# Patient Record
Sex: MALE | Race: BLACK OR AFRICAN AMERICAN | NOT HISPANIC OR LATINO | Employment: UNEMPLOYED | URBAN - METROPOLITAN AREA
[De-identification: names, ages, dates, MRNs, and addresses within clinical notes are randomized per-mention and may not be internally consistent; named-entity substitution may affect disease eponyms.]

---

## 2018-04-17 ENCOUNTER — OFFICE VISIT (OUTPATIENT)
Dept: FAMILY MEDICINE | Facility: CLINIC | Age: 38
End: 2018-04-17
Payer: COMMERCIAL

## 2018-04-17 VITALS
RESPIRATION RATE: 16 BRPM | DIASTOLIC BLOOD PRESSURE: 80 MMHG | WEIGHT: 180 LBS | SYSTOLIC BLOOD PRESSURE: 130 MMHG | BODY MASS INDEX: 27.28 KG/M2 | HEART RATE: 104 BPM | OXYGEN SATURATION: 100 % | HEIGHT: 68 IN

## 2018-04-17 DIAGNOSIS — M54.41 CHRONIC MIDLINE LOW BACK PAIN WITH BILATERAL SCIATICA: ICD-10-CM

## 2018-04-17 DIAGNOSIS — F33.1 MODERATE EPISODE OF RECURRENT MAJOR DEPRESSIVE DISORDER (H): Primary | ICD-10-CM

## 2018-04-17 DIAGNOSIS — J20.9 ACUTE BRONCHITIS TREATED WITH ANTIBIOTICS IN THE PAST 60 DAYS: ICD-10-CM

## 2018-04-17 DIAGNOSIS — Z23 NEED FOR PROPHYLACTIC VACCINATION AGAINST STREPTOCOCCUS PNEUMONIAE (PNEUMOCOCCUS): ICD-10-CM

## 2018-04-17 DIAGNOSIS — E11.65 TYPE 2 DIABETES MELLITUS WITH HYPERGLYCEMIA, WITH LONG-TERM CURRENT USE OF INSULIN (H): ICD-10-CM

## 2018-04-17 DIAGNOSIS — M54.42 CHRONIC MIDLINE LOW BACK PAIN WITH BILATERAL SCIATICA: ICD-10-CM

## 2018-04-17 DIAGNOSIS — Z55.0 ILLITERACY: ICD-10-CM

## 2018-04-17 DIAGNOSIS — G89.29 CHRONIC MIDLINE LOW BACK PAIN WITH BILATERAL SCIATICA: ICD-10-CM

## 2018-04-17 DIAGNOSIS — E78.5 HYPERLIPIDEMIA LDL GOAL <100: ICD-10-CM

## 2018-04-17 DIAGNOSIS — F33.41 RECURRENT MAJOR DEPRESSIVE DISORDER, IN PARTIAL REMISSION (H): ICD-10-CM

## 2018-04-17 DIAGNOSIS — Z79.4 TYPE 2 DIABETES MELLITUS WITH HYPERGLYCEMIA, WITH LONG-TERM CURRENT USE OF INSULIN (H): ICD-10-CM

## 2018-04-17 DIAGNOSIS — Z23 NEED FOR PROPHYLACTIC VACCINATION WITH TETANUS-DIPHTHERIA (TD): ICD-10-CM

## 2018-04-17 DIAGNOSIS — E55.9 VITAMIN D DEFICIENCY: ICD-10-CM

## 2018-04-17 DIAGNOSIS — Z13.89 SCREENING FOR DIABETIC PERIPHERAL NEUROPATHY: ICD-10-CM

## 2018-04-17 LAB — HBA1C MFR BLD: 10.9 % (ref 0–5.6)

## 2018-04-17 PROCEDURE — 99205 OFFICE O/P NEW HI 60 MIN: CPT | Performed by: FAMILY MEDICINE

## 2018-04-17 PROCEDURE — 36415 COLL VENOUS BLD VENIPUNCTURE: CPT | Performed by: FAMILY MEDICINE

## 2018-04-17 PROCEDURE — 80048 BASIC METABOLIC PNL TOTAL CA: CPT | Performed by: FAMILY MEDICINE

## 2018-04-17 PROCEDURE — 84460 ALANINE AMINO (ALT) (SGPT): CPT | Performed by: FAMILY MEDICINE

## 2018-04-17 PROCEDURE — 84443 ASSAY THYROID STIM HORMONE: CPT | Performed by: FAMILY MEDICINE

## 2018-04-17 PROCEDURE — 82043 UR ALBUMIN QUANTITATIVE: CPT | Performed by: FAMILY MEDICINE

## 2018-04-17 PROCEDURE — 80061 LIPID PANEL: CPT | Performed by: FAMILY MEDICINE

## 2018-04-17 PROCEDURE — 83036 HEMOGLOBIN GLYCOSYLATED A1C: CPT | Performed by: FAMILY MEDICINE

## 2018-04-17 RX ORDER — ATORVASTATIN CALCIUM 80 MG/1
80 TABLET, FILM COATED ORAL DAILY
Qty: 30 TABLET | Refills: 11 | Status: SHIPPED | OUTPATIENT
Start: 2018-04-17

## 2018-04-17 RX ORDER — FLUOXETINE 10 MG/1
10 CAPSULE ORAL DAILY
Qty: 42 CAPSULE | Refills: 0 | Status: SHIPPED | OUTPATIENT
Start: 2018-04-17

## 2018-04-17 RX ORDER — LISINOPRIL 2.5 MG/1
2.5 TABLET ORAL DAILY
Qty: 90 TABLET | Refills: 3 | Status: SHIPPED | OUTPATIENT
Start: 2018-04-17

## 2018-04-17 RX ORDER — ASPIRIN 81 MG/1
81 TABLET ORAL
COMMUNITY
Start: 2018-03-21 | End: 2018-04-17

## 2018-04-17 RX ORDER — ATORVASTATIN CALCIUM 80 MG/1
80 TABLET, FILM COATED ORAL
COMMUNITY
Start: 2018-03-21

## 2018-04-17 RX ORDER — LISINOPRIL 2.5 MG/1
2.5 TABLET ORAL
COMMUNITY
Start: 2018-03-21 | End: 2018-04-17

## 2018-04-17 RX ORDER — ASPIRIN 81 MG/1
81 TABLET ORAL DAILY
Qty: 90 TABLET | Refills: 3 | Status: SHIPPED | OUTPATIENT
Start: 2018-04-17

## 2018-04-17 RX ORDER — INSULIN GLARGINE 100 [IU]/ML
48 INJECTION, SOLUTION SUBCUTANEOUS DAILY
Qty: 15 ML | Refills: 1 | Status: SHIPPED | OUTPATIENT
Start: 2018-04-17 | End: 2018-04-20

## 2018-04-17 SDOH — EDUCATIONAL SECURITY - EDUCATION ATTAINMENT: ILITERACY AND LOW LEVEL LITERACY: Z55.0

## 2018-04-17 ASSESSMENT — PATIENT HEALTH QUESTIONNAIRE - PHQ9
SUM OF ALL RESPONSES TO PHQ QUESTIONS 1-9: 20
SUM OF ALL RESPONSES TO PHQ QUESTIONS 1-9: 20
10. IF YOU CHECKED OFF ANY PROBLEMS, HOW DIFFICULT HAVE THESE PROBLEMS MADE IT FOR YOU TO DO YOUR WORK, TAKE CARE OF THINGS AT HOME, OR GET ALONG WITH OTHER PEOPLE: EXTREMELY DIFFICULT

## 2018-04-17 NOTE — PATIENT INSTRUCTIONS
(F33.1) Moderate episode of recurrent major depressive disorder (H)  (primary encounter diagnosis)  Comment:    Plan: PSYCHOLOGY REFERRAL, FLUoxetine (PROZAC) 10 MG         capsule, FLUoxetine (PROZAC) 20 MG capsule             (Z55.0) Illiteracy  Comment:    Plan: PSYCHOLOGY REFERRAL                 (Z13.89) Screening for diabetic peripheral neuropathy  Comment:     Plan: FOOT EXAM  NO CHARGE [86181.114]             (Z23) Need for prophylactic vaccination against Streptococcus pneumoniae (pneumococcus)  Comment:    Plan:      (Z23) Need for prophylactic vaccination with tetanus-diphtheria (TD)  Comment:    Plan:      (E55.9) Vitamin D deficiency  Comment:    Plan:      (M54.41,  M54.42,  G89.29) Chronic midline low back pain with bilateral sciatica  Comment:    Plan:      (E78.5) Hyperlipidemia LDL goal <100  Comment:    Plan: Lipid panel reflex to direct LDL Fasting, ALT,         atorvastatin (LIPITOR) 80 MG tablet, aspirin 81        MG EC tablet, aspirin 81 MG EC tablet,         CANCELED: Lipid panel reflex to direct LDL         Fasting             (F33.41) Recurrent major depressive disorder, in partial remission (H)  Comment:    Plan:      (E11.65,  Z79.4) Type 2 diabetes mellitus with hyperglycemia, with long-term current use of insulin (H)  Comment:    Plan: HEMOGLOBIN A1C, Albumin Random Urine         Quantitative with Creat Ratio, TSH WITH FREE T4        REFLEX, Basic metabolic panel, BASAGLAR 100         UNIT/ML injection, metFORMIN (GLUCOPHAGE) 1000         MG tablet, aspirin 81 MG EC tablet, aspirin 81         MG EC tablet, lisinopril (PRINIVIL/ZESTRIL) 2.5        MG tablet, aspirin EC 81 MG EC tablet, insulin         pen needle (FIFTY50 PEN NEEDLES) 32G X 6 MM,         CANCELED: Hemoglobin A1c             (J20.9) Acute bronchitis treated with antibiotics in the past 60 days  Comment:    Plan:

## 2018-04-17 NOTE — NURSING NOTE
"Chief Complaint   Patient presents with     Patient Request for Note/Letter     pt not seen here since 2013 pt has been going to [facility]       Initial /80  Pulse 104  Resp 16  Ht 5' 8\" (1.727 m)  Wt 180 lb (81.6 kg)  SpO2 100%  BMI 27.37 kg/m2 Estimated body mass index is 27.37 kg/(m^2) as calculated from the following:    Height as of this encounter: 5' 8\" (1.727 m).    Weight as of this encounter: 180 lb (81.6 kg).  Medication Reconciliation: anthony Lala CMA      "

## 2018-04-17 NOTE — LETTER
"April 20, 2018      Aguilar CHINTAN Phillips  [address]  [facility] [address]        Dear Jacqueline,    We are writing to inform you of your test results.    Abnormal DIABETES URINE PROTEIN TEST   HIGH TOTAL CHOLESTEROL   NORMAL TRIGLYCERIDES   NORMAL HDL OR \"GOOD\" CHOLESTEROL   LDL OR \"BAD\" CHOLESTEROL 50% TOO HIGH   BORDERLINE  HIGH VERY LOW DENSITY CHOLESTEROL   NORMAL THYROID STIMULATING HORMONE TEST   NORMAL LIVER FUNCTION TEST   HIGH FASTING BLOOD SUGAR 100 POINTS TOO HIGH   NORMAL RENAL FUNCTION   NORMAL BLOOD SALTS   POORLY CONTROLLED DIABETES   A1C AVERAGE    GOAL A1C AVERAGE  OR LESS   ,THAT IS 8 OR LESS   Current Outpatient Prescriptions:   aspirin 81 MG EC tablet   aspirin 81 MG EC tablet   aspirin EC 81 MG EC tablet   atorvastatin (LIPITOR) 80 MG tablet   atorvastatin (LIPITOR) 80 MG tablet   BASAGLAR 100 UNIT/ML injection   FLUoxetine (PROZAC) 10 MG capsule   [START ON 5/17/2018] FLUoxetine (PROZAC) 20 MG capsule   insulin pen needle (FIFTY50 PEN NEEDLES) 32G X 6 MM   lisinopril (PRINIVIL/ZESTRIL) 2.5 MG tablet   metFORMIN (GLUCOPHAGE) 1000 MG tablet   Alcohol Swabs 70 % PADS   Blood Glucose Monitoring Suppl ([employer] a [employer] CONTOUR MONITOR) W/DEVICE KIT   Glucose Blood (LIT CONTOUR TEST) strip   Insulin Pen Needle (B-D U/F PEN NEEDLE) needle   STRONGLY RECOMMENDED THAT YOU INCREASE  BASAGLAR SLOWLY  48 INCREASE DAILY UNTIL   FASTING BLOOD SUGAR  MG% OR LESS     Resulted Orders   HEMOGLOBIN A1C   Result Value Ref Range    Hemoglobin A1C 10.9 (H) 0 - 5.6 %      Comment:      Normal <5.7% Prediabetes 5.7-6.4%  Diabetes 6.5% or higher - adopted from ADA   consensus guidelines.     Lipid panel reflex to direct LDL Fasting   Result Value Ref Range    Cholesterol 225 (H) <200 mg/dL      Comment:      Desirable:       <200 mg/dl    Triglycerides 146 <150 mg/dL      Comment:      Non Fasting    HDL Cholesterol 50 >39 mg/dL    LDL Cholesterol Calculated 146 (H) <100 mg/dL      Comment:      Above " desirable:  100-129 mg/dl  Borderline High:  130-159 mg/dL  High:             160-189 mg/dL  Very high:       >189 mg/dl      Non HDL Cholesterol 175 (H) <130 mg/dL      Comment:      Above Desirable:  130-159 mg/dl  Borderline high:  160-189 mg/dl  High:             190-219 mg/dl  Very high:       >219 mg/dl     Albumin Random Urine Quantitative with Creat Ratio   Result Value Ref Range    Creatinine Urine 217 mg/dL    Albumin Urine mg/L 78 mg/L    Albumin Urine mg/g Cr 35.76 (H) 0 - 17 mg/g Cr   TSH WITH FREE T4 REFLEX   Result Value Ref Range    TSH 2.93 0.40 - 4.00 mU/L   Basic metabolic panel   Result Value Ref Range    Sodium 137 133 - 144 mmol/L    Potassium 3.7 3.4 - 5.3 mmol/L    Chloride 102 94 - 109 mmol/L    Carbon Dioxide 28 20 - 32 mmol/L    Anion Gap 7 3 - 14 mmol/L    Glucose 201 (H) 70 - 99 mg/dL      Comment:      Non Fasting    Urea Nitrogen 12 7 - 30 mg/dL    Creatinine 0.56 (L) 0.66 - 1.25 mg/dL    GFR Estimate >90 >60 mL/min/1.7m2      Comment:      Non  GFR Calc    GFR Estimate If Black >90 >60 mL/min/1.7m2      Comment:       GFR Calc    Calcium 9.1 8.5 - 10.1 mg/dL   ALT   Result Value Ref Range    ALT 27 0 - 70 U/L       If you have any questions or concerns, please call the clinic at the number listed above.       Sincerely,        THO ROCK MD
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
## 2018-04-17 NOTE — MR AVS SNAPSHOT
After Visit Summary   4/17/2018    Aguilar Phillips    MRN: [id number]           Patient Information     Date Of Birth          1980        Visit Information        Provider Department      4/17/2018 1:00 PM John Swanson MD; [employer] [facility]        Today's Diagnoses     Moderate episode of recurrent major depressive disorder (H)    -  1    Illiteracy        Screening for diabetic peripheral neuropathy        Need for prophylactic vaccination against Streptococcus pneumoniae (pneumococcus)        Need for prophylactic vaccination with tetanus-diphtheria (TD)        Vitamin D deficiency        Chronic midline low back pain with bilateral sciatica        Hyperlipidemia LDL goal <100        Recurrent major depressive disorder, in partial remission (H)        Type 2 diabetes mellitus with hyperglycemia, with long-term current use of insulin (H)        Acute bronchitis treated with antibiotics in the past 60 days          Care Instructions    (F33.1) Moderate episode of recurrent major depressive disorder (H)  (primary encounter diagnosis)  Comment:    Plan: PSYCHOLOGY REFERRAL, FLUoxetine (PROZAC) 10 MG         capsule, FLUoxetine (PROZAC) 20 MG capsule             (Z55.0) Illiteracy  Comment:    Plan: PSYCHOLOGY REFERRAL                 (Z13.89) Screening for diabetic peripheral neuropathy  Comment:     Plan: FOOT EXAM  NO CHARGE [69476.114]             (Z23) Need for prophylactic vaccination against Streptococcus pneumoniae (pneumococcus)  Comment:    Plan:      (Z23) Need for prophylactic vaccination with tetanus-diphtheria (TD)  Comment:    Plan:      (E55.9) Vitamin D deficiency  Comment:    Plan:      (M54.41,  M54.42,  G89.29) Chronic midline low back pain with bilateral sciatica  Comment:    Plan:      (E78.5) Hyperlipidemia LDL goal <100  Comment:    Plan: Lipid panel reflex to direct LDL Fasting, ALT,         atorvastatin  (LIPITOR) 80 MG tablet, aspirin 81        MG EC tablet, aspirin 81 MG EC tablet,         CANCELED: Lipid panel reflex to direct LDL         Fasting             (F33.41) Recurrent major depressive disorder, in partial remission (H)  Comment:    Plan:      (E11.65,  Z79.4) Type 2 diabetes mellitus with hyperglycemia, with long-term current use of insulin (H)  Comment:    Plan: HEMOGLOBIN A1C, Albumin Random Urine         Quantitative with Creat Ratio, TSH WITH FREE T4        REFLEX, Basic metabolic panel, BASAGLAR 100         UNIT/ML injection, metFORMIN (GLUCOPHAGE) 1000         MG tablet, aspirin 81 MG EC tablet, aspirin 81         MG EC tablet, lisinopril (PRINIVIL/ZESTRIL) 2.5        MG tablet, aspirin EC 81 MG EC tablet, insulin         pen needle (FIFTY50 PEN NEEDLES) 32G X 6 MM,         CANCELED: Hemoglobin A1c             (J20.9) Acute bronchitis treated with antibiotics in the past 60 days  Comment:    Plan:                Follow-ups after your visit        Additional Services     PSYCHOLOGY REFERRAL       Your provider has referred you to:  PABLO SAENZ PHD  LICENSED PSYCHOLOGIST  [address] [address]  PHONE [phone]  FAX: [phone]  E-MAIL: [email or web address]    John Swanson Jr., MD   [address]  [place] [address]    PHONE [phone]  FAX:[phone]    PATIENT NAME Aguilar Phillips    SEX: male   YOB: 1980  SOCIAL SECURITY nUMBER: 31-  Green card number [id number]  PHONE NUMBER: [phone]  ADDRESS: *[address]  [address]  [facility] [address]  Phone: [phone]  Fax: [phone]  Brian[address]  Jose velázquez [address]  iNSURANCE NAME AND NUMBER IF KNOWN:  [employer] insurance  LANGUAGE: [place]an  NAME AND NUMBER OF REGULAR : [phone]  PURPOSE OF REFERRAL:  SSI___  INS WAIVER__x_ PERSONAL CARE  "ASSISTANT SERVICES:___ MENTAL HEALTH SERVICES:____  OTHER: ___  PSYCHOTROPIC MEDICATION: IF ANY:      Please be aware that coverage of these services is subject to the terms and limitations of your health insurance plan.  Call member services at your health plan with any benefit or coverage questions.      Please bring the following to your appointment:    >>   Any x-rays, CTs or MRIs which have been performed.  Contact the facility where they were done to arrange for  prior to your scheduled appointment.   >>   List of current medications   >>   This referral request   >>   Any documents/labs given to you for this referral                  Who to contact     If you have questions or need follow up information about today's clinic visit or your schedule please contact [facility] directly at [phone].  Normal or non-critical lab and imaging results will be communicated to you by MyChart, letter or phone within 4 business days after the clinic has received the results. If you do not hear from us within 7 days, please contact the clinic through MyChart or phone. If you have a critical or abnormal lab result, we will notify you by phone as soon as possible.  Submit refill requests through [employer] or call your pharmacy and they will forward the refill request to us. Please allow 3 business days for your refill to be completed.          Additional Information About Your Visit        [employer] Information     [employer] lets you send messages to your doctor, view your test results, renew your prescriptions, schedule appointments and more. To sign up, go to [email or web address] . Click on \"Log in\" on the left side of the screen, which will take you to the Welcome page. Then click on \"Sign up Now\" on the right side of the page.     You will be asked to enter the access code listed below, as well as some personal information. Please follow the directions to create your username and " "password.     Your access code is: [email or web address]  Expires: 2018  2:32 PM     Your access code will  in 90 days. If you need help or a new code, please call your [facility] clinic or [phone].        Care EveryWhere ID     This is your Care EveryWhere ID. This could be used by other organizations to access your [facility] medical records  [id number]        Your Vitals Were     Pulse Respirations Height Pulse Oximetry BMI (Body Mass Index)       104 16 5' 8\" (1.727 m) 100% 27.37 kg/m2        Blood Pressure from Last 3 Encounters:   18 130/80   10/05/13 141/89   13 118/79    Weight from Last 3 Encounters:   18 180 lb (81.6 kg)   13 174 lb 9.6 oz (79.2 kg)   13 182 lb (82.6 kg)              We Performed the Following     Albumin Random Urine Quantitative with Creat Ratio     ALT     Basic metabolic panel     DEPRESSION ACTION PLAN (DAP)     FOOT EXAM  NO CHARGE [25089.114]     HEMOGLOBIN A1C     Lipid panel reflex to direct LDL Fasting     PSYCHOLOGY REFERRAL     TSH WITH FREE T4 REFLEX          Today's Medication Changes          These changes are accurate as of 18  2:33 PM.  If you have any questions, ask your nurse or doctor.               Start taking these medicines.        Dose/Directions    * FLUoxetine 10 MG capsule   Commonly known as:  PROzac   Used for:  Moderate episode of recurrent major depressive disorder (H)   Started by:  John Swanson MD        Dose:  10 mg   Take 1 capsule (10 mg) by mouth daily   Quantity:  42 capsule   Refills:  0       * FLUoxetine 20 MG capsule   Commonly known as:  PROzac   Used for:  Moderate episode of recurrent major depressive disorder (H)   Started by:  John Swanson MD        Dose:  20 mg   Start taking on:  2018   Take 1 capsule (20 mg) by mouth daily   Quantity:  90 capsule   Refills:  3       * Notice:  This list has 2 medication(s) that are the same as other medications prescribed for you. Read the " directions carefully, and ask your doctor or other care provider to review them with you.      These medicines have changed or have updated prescriptions.        Dose/Directions    * aspirin 81 MG EC tablet   This may have changed:  You were already taking a medication with the same name, and this prescription was added. Make sure you understand how and when to take each.   Used for:  Type 2 diabetes mellitus with hyperglycemia, with long-term current use of insulin (H), Hyperlipidemia LDL goal <100   Changed by:  John Swanson MD        Dose:  81 mg   Take 1 tablet (81 mg) by mouth daily   Quantity:  90 tablet   Refills:  prn       * aspirin 81 MG EC tablet   This may have changed:  You were already taking a medication with the same name, and this prescription was added. Make sure you understand how and when to take each.   Used for:  Type 2 diabetes mellitus with hyperglycemia, with long-term current use of insulin (H), Hyperlipidemia LDL goal <100   Changed by:  John Swanson MD        Dose:  81 mg   Take 1 tablet (81 mg) by mouth daily   Quantity:  90 tablet   Refills:  prn       * aspirin EC 81 MG EC tablet   This may have changed:  when to take this   Used for:  Type 2 diabetes mellitus with hyperglycemia, with long-term current use of insulin (H)   Changed by:  John Swanson MD        Dose:  81 mg   Take 1 tablet (81 mg) by mouth daily   Quantity:  90 tablet   Refills:  3       * atorvastatin 80 MG tablet   Commonly known as:  LIPITOR   This may have changed:  Another medication with the same name was added. Make sure you understand how and when to take each.   Changed by:  John Swanson MD        Dose:  80 mg   Take 80 mg by mouth   Refills:  0       * atorvastatin 80 MG tablet   Commonly known as:  LIPITOR   This may have changed:  You were already taking a medication with the same name, and this prescription was added. Make sure you understand how and when to  take each.   Used for:  Hyperlipidemia LDL goal <100   Changed by:  John Swanson MD        Dose:  80 mg   Take 1 tablet (80 mg) by mouth daily   Quantity:  30 tablet   Refills:  11       * B-D U/F 31G X 5 MM   This may have changed:  Another medication with the same name was changed. Make sure you understand how and when to take each.   Used for:  Type II or unspecified type diabetes mellitus without mention of complication, uncontrolled, Type 2 diabetes, HbA1c goal < 7% (H)   Generic drug:  insulin pen needle   Changed by:  John Swanson MD        Usex  One time  daily or as directed. At bed time   Quantity:  100 each   Refills:  prn       * insulin pen needle 32G X 6 MM   Commonly known as:  FIFTY50 PEN NEEDLES   This may have changed:  See the new instructions.   Used for:  Type 2 diabetes mellitus with hyperglycemia, with long-term current use of insulin (H)   Changed by:  John Swanson MD        Pen needles   Quantity:  100 each   Refills:  11       * LANTUS SOLOSTAR 100 UNIT/ML injection   This may have changed:  Another medication with the same name was added. Make sure you understand how and when to take each.   Used for:  Type II or unspecified type diabetes mellitus without mention of complication, uncontrolled, Type 2 diabetes, HbA1c goal < 7% (H)   Generic drug:  insulin glargine   Changed by:  John Swanson MD        Dose:  14 Units   Inject 14 Units Subcutaneous At Bedtime.   Quantity:  1 Month   Refills:  11       * BASAGLAR 100 UNIT/ML injection   This may have changed:  You were already taking a medication with the same name, and this prescription was added. Make sure you understand how and when to take each.   Used for:  Type 2 diabetes mellitus with hyperglycemia, with long-term current use of insulin (H)   Changed by:  John Swanson MD        Dose:  48 Units   Inject 48 Units Subcutaneous daily   Quantity:  15 mL   Refills:  1        lisinopril 2.5 MG tablet   Commonly known as:  PRINIVIL/Zestril   This may have changed:  when to take this   Used for:  Type 2 diabetes mellitus with hyperglycemia, with long-term current use of insulin (H)   Changed by:  John Swanson MD        Dose:  2.5 mg   Take 1 tablet (2.5 mg) by mouth daily   Quantity:  90 tablet   Refills:  3       metFORMIN 1000 MG tablet   Commonly known as:  GLUCOPHAGE   This may have changed:  when to take this   Used for:  Type 2 diabetes mellitus with hyperglycemia, with long-term current use of insulin (H)   Changed by:  John Swanson MD        Dose:  1000 mg   Take 1 tablet (1,000 mg) by mouth 2 times daily (with meals)   Quantity:  60 tablet   Refills:  11       * Notice:  This list has 9 medication(s) that are the same as other medications prescribed for you. Read the directions carefully, and ask your doctor or other care provider to review them with you.         Where to get your medicines      These medications were sent to [facility] [address] [address]     Phone:  [phone]     aspirin 81 MG EC tablet    aspirin 81 MG EC tablet    aspirin EC 81 MG EC tablet    atorvastatin 80 MG tablet    BASAGLAR 100 UNIT/ML injection    FLUoxetine 10 MG capsule    FLUoxetine 20 MG capsule    insulin pen needle 32G X 6 MM    lisinopril 2.5 MG tablet    metFORMIN 1000 MG tablet                Primary Care Provider Office Phone # Fax #    John Swanson [phone] [address] [facility] [address]        Equal Access to Services     [facility] FRANCHESCA AH: Hadii leoncio songo [place], waaxda luqadaha, qaybta kaalmada adeegwicho odrado idiin hayaan adeeg kharash la'aan . So [facility] [phone].    ATENCIÓN: Si habla español, tiene a contreras disposición servicios gratuitos de asistencia lingüística. Llame al [phone].    We comply with applicable federal civil rights laws and [place]  laws. We do not discriminate on the basis of race, color, national origin, age, disability, sex, sexual orientation, or gender identity.            Thank you!     Thank you for choosing [facility]  for your care. Our goal is always to provide you with excellent care. Hearing back from our patients is one way we can continue to improve our services. Please take a few minutes to complete the written survey that you may receive in the mail after your visit with us. Thank you!             Your Updated Medication List - Protect others around you: Learn how to safely use, store and throw away your medicines at www.disposemymeds.org.          This list is accurate as of 4/17/18  2:33 PM.  Always use your most recent med list.                   Brand Name Dispense Instructions for use Diagnosis    alcohol swab prep pads     100 each    1 pad At Bedtime.    Type II or unspecified type diabetes mellitus without mention of complication, uncontrolled, Type 2 diabetes, HbA1c goal < 7% (H)       * aspirin 81 MG EC tablet     90 tablet    Take 1 tablet (81 mg) by mouth daily    Type 2 diabetes mellitus with hyperglycemia, with long-term current use of insulin (H), Hyperlipidemia LDL goal <100       * aspirin 81 MG EC tablet     90 tablet    Take 1 tablet (81 mg) by mouth daily    Type 2 diabetes mellitus with hyperglycemia, with long-term current use of insulin (H), Hyperlipidemia LDL goal <100       * aspirin EC 81 MG EC tablet     90 tablet    Take 1 tablet (81 mg) by mouth daily    Type 2 diabetes mellitus with hyperglycemia, with long-term current use of insulin (H)       * atorvastatin 80 MG tablet    LIPITOR     Take 80 mg by mouth        * atorvastatin 80 MG tablet    LIPITOR    30 tablet    Take 1 tablet (80 mg) by mouth daily    Hyperlipidemia LDL goal <100       * B-D U/F 31G X 5 MM   Generic drug:  insulin pen needle     100 each    Usex  One time  daily or as directed. At bed time     Type II or unspecified type diabetes mellitus without mention of complication, uncontrolled, Type 2 diabetes, HbA1c goal < 7% (H)       * insulin pen needle 32G X 6 MM    FIFTY50 PEN NEEDLES    100 each    Pen needles    Type 2 diabetes mellitus with hyperglycemia, with long-term current use of insulin (H)       LIT CONTOUR test strip   Generic drug:  blood glucose monitoring     100 strip    Use twice daily    Diabetes mellitus, type 2 (H)       blood glucose monitoring meter device kit     1 kit    Use to test blood sugars 2 times daily as directed.    Diabetes mellitus, type 2 (H)       * FLUoxetine 10 MG capsule    PROzac    42 capsule    Take 1 capsule (10 mg) by mouth daily    Moderate episode of recurrent major depressive disorder (H)       * FLUoxetine 20 MG capsule   Start taking on:  5/17/2018    PROzac    90 capsule    Take 1 capsule (20 mg) by mouth daily    Moderate episode of recurrent major depressive disorder (H)       * LANTUS SOLOSTAR 100 UNIT/ML injection   Generic drug:  insulin glargine     1 Month    Inject 14 Units Subcutaneous At Bedtime.    Type II or unspecified type diabetes mellitus without mention of complication, uncontrolled, Type 2 diabetes, HbA1c goal < 7% (H)       * BASAGLAR 100 UNIT/ML injection     15 mL    Inject 48 Units Subcutaneous daily    Type 2 diabetes mellitus with hyperglycemia, with long-term current use of insulin (H)       lisinopril 2.5 MG tablet    PRINIVIL/Zestril    90 tablet    Take 1 tablet (2.5 mg) by mouth daily    Type 2 diabetes mellitus with hyperglycemia, with long-term current use of insulin (H)       metFORMIN 1000 MG tablet    GLUCOPHAGE    60 tablet    Take 1 tablet (1,000 mg) by mouth 2 times daily (with meals)    Type 2 diabetes mellitus with hyperglycemia, with long-term current use of insulin (H)       * Notice:  This list has 11 medication(s) that are the same as other medications prescribed for you. Read the directions carefully, and ask your  doctor or other care provider to review them with you.

## 2018-04-17 NOTE — LETTER
My Depression Action Plan  Name: Aguilar Phillips   Date of Birth 1980  Date: 4/17/2018    My doctor: John Swanson   My clinic: [address]  [facility] [address] [phone]          GREEN    ZONE   Good Control    What it looks like:     Things are going generally well. You have normal up s and down s. You may even feel depressed from time to time, but bad moods usually last less than a day.   What you need to do:  1. Continue to care for yourself (see self care plan)  2. Check your depression survival kit and update it as needed  3. Follow your physician s recommendations including any medication.  4. Do not stop taking medication unless you consult with your physician first.           YELLOW         ZONE Getting Worse    What it looks like:     Depression is starting to interfere with your life.     It may be hard to get out of bed; you may be starting to isolate yourself from others.    Symptoms of depression are starting to last most all day and this has happened for several days.     You may have suicidal thoughts but they are not constant.   What you need to do:     1. Call your care team, your response to treatment will improve if you keep your care team informed of your progress. Yellow periods are signs an adjustment may need to be made.     2. Continue your self-care, even if you have to fake it!    3. Talk to someone in your support network    4. Open up your depression survival kit           RED    ZONE Medical Alert - Get Help    What it looks like:     Depression is seriously interfering with your life.     You may experience these or other symptoms: You can t get out of bed most days, can t work or engage in other necessary activities, you have trouble taking care of basic hygiene, or basic responsibilities, thoughts of suicide or death that will not go away, self-injurious behavior.     What you need to  do:  1. Call your care team and request a same-day appointment. If they are not available (weekends or after hours) call your local crisis line, emergency room or 911.            Depression Self Care Plan / Survival Kit    Self-Care for Depression  Here s the deal. Your body and mind are really not as separate as most people think.  What you do and think affects how you feel and how you feel influences what you do and think. This means if you do things that people who feel good do, it will help you feel better.  Sometimes this is all it takes.  There is also a place for medication and therapy depending on how severe your depression is, so be sure to consult with your medical provider and/ or Behavioral Health Consultant if your symptoms are worsening or not improving.     In order to better manage my stress, I will:    Exercise  Get some form of exercise, every day. This will help reduce pain and release endorphins, the  feel good  chemicals in your brain. This is almost as good as taking antidepressants!  This is not the same as joining a gym and then never going! (they count on that by the way ) It can be as simple as just going for a walk or doing some gardening, anything that will get you moving.      Hygiene   Maintain good hygiene (Get out of bed in the morning, Make your bed, Brush your teeth, Take a shower, and Get dressed like you were going to work, even if you are unemployed).  If your clothes don't fit try to get ones that do.    Diet  I will strive to eat foods that are good for me, drink plenty of water, and avoid excessive sugar, caffeine, alcohol, and other mood-altering substances.  Some foods that are helpful in depression are: complex carbohydrates, B vitamins, flaxseed, fish or fish oil, fresh fruits and vegetables.    Psychotherapy  I agree to participate in Individual Therapy (if recommended).    Medication  If prescribed medications, I agree to take them.  Missing doses can result in serious  side effects.  I understand that drinking alcohol, or other illicit drug use, may cause potential side effects.  I will not stop my medication abruptly without first discussing it with my provider.    Staying Connected With Others  I will stay in touch with my friends, family members, and my primary care provider/team.    Use your imagination  Be creative.  We all have a creative side; it doesn t matter if it s oil painting, sand castles, or mud pies! This will also kick up the endorphins.    Witness Beauty  (AKA stop and smell the roses) Take a look outside, even in mid-winter. Notice colors, textures. Watch the squirrels and birds.     Service to others  Be of service to others.  There is always someone else in need.  By helping others we can  get out of ourselves  and remember the really important things.  This also provides opportunities for practicing all the other parts of the program.    Humor  Laugh and be silly!  Adjust your TV habits for less news and crime-drama and more comedy.    Control your stress  Try breathing deep, massage therapy, biofeedback, and meditation. Find time to relax each day.     My support system    Clinic Contact:  Phone number:    Contact 1:  Phone number:    Contact 2:  Phone number:    [place]/:  Phone number:    Therapist:  Phone number:    Local crisis center:    Phone number:    Other community support:  Phone number:

## 2018-04-18 LAB
ALT SERPL W P-5'-P-CCNC: 27 U/L (ref 0–70)
ANION GAP SERPL CALCULATED.3IONS-SCNC: 7 MMOL/L (ref 3–14)
BUN SERPL-MCNC: 12 MG/DL (ref 7–30)
CALCIUM SERPL-MCNC: 9.1 MG/DL (ref 8.5–10.1)
CHLORIDE SERPL-SCNC: 102 MMOL/L (ref 94–109)
CHOLEST SERPL-MCNC: 225 MG/DL
CO2 SERPL-SCNC: 28 MMOL/L (ref 20–32)
CREAT SERPL-MCNC: 0.56 MG/DL (ref 0.66–1.25)
GFR SERPL CREATININE-BSD FRML MDRD: >90 ML/MIN/1.7M2
GLUCOSE SERPL-MCNC: 201 MG/DL (ref 70–99)
HDLC SERPL-MCNC: 50 MG/DL
LDLC SERPL CALC-MCNC: 146 MG/DL
NONHDLC SERPL-MCNC: 175 MG/DL
POTASSIUM SERPL-SCNC: 3.7 MMOL/L (ref 3.4–5.3)
SODIUM SERPL-SCNC: 137 MMOL/L (ref 133–144)
TRIGL SERPL-MCNC: 146 MG/DL
TSH SERPL DL<=0.005 MIU/L-ACNC: 2.93 MU/L (ref 0.4–4)

## 2018-04-18 ASSESSMENT — PATIENT HEALTH QUESTIONNAIRE - PHQ9: SUM OF ALL RESPONSES TO PHQ QUESTIONS 1-9: 20

## 2018-04-20 DIAGNOSIS — E11.65 TYPE 2 DIABETES MELLITUS WITH HYPERGLYCEMIA, WITH LONG-TERM CURRENT USE OF INSULIN (H): ICD-10-CM

## 2018-04-20 DIAGNOSIS — Z79.4 TYPE 2 DIABETES MELLITUS WITH HYPERGLYCEMIA, WITH LONG-TERM CURRENT USE OF INSULIN (H): ICD-10-CM

## 2018-04-20 LAB
CREAT UR-MCNC: 217 MG/DL
MICROALBUMIN UR-MCNC: 78 MG/L
MICROALBUMIN/CREAT UR: 35.76 MG/G CR (ref 0–17)

## 2018-04-20 RX ORDER — INSULIN GLARGINE 100 [IU]/ML
48-60 INJECTION, SOLUTION SUBCUTANEOUS DAILY
Qty: 15 ML | Refills: 11 | Status: SHIPPED | OUTPATIENT
Start: 2018-04-20

## 2018-04-25 ENCOUNTER — TELEPHONE (OUTPATIENT)
Dept: FAMILY MEDICINE | Facility: CLINIC | Age: 38
End: 2018-04-25

## 2018-04-25 DIAGNOSIS — F33.41 RECURRENT MAJOR DEPRESSIVE DISORDER, IN PARTIAL REMISSION (H): Primary | ICD-10-CM

## 2018-04-25 DIAGNOSIS — F33.1 MODERATE EPISODE OF RECURRENT MAJOR DEPRESSIVE DISORDER (H): ICD-10-CM

## 2018-04-25 NOTE — TELEPHONE ENCOUNTER
Reason for Call:  Other    FYI regarding patient     Detailed comments:  Dr Suarez scheduled this patient today at 2:00 at the patients urgent request   He failed to show for the appointment  Dr Barron called his daughter who was unable to reach her father    Phone Number Patient can be reached at: Other phone number:  [phone]    Best Time: anytime    Can we leave a detailed message on this number? YES    Call taken on 4/25/2018 at 4:45 PM by TORRI RG

## 2018-04-26 NOTE — TELEPHONE ENCOUNTER
I SPENT 1.5 HOURS WITH THIS PATIENT FILLING IN THE FORM FOR HIM   IS IT NOT GOOD ENOUGH ??  SHOULD TRY TO GET INTO DR SAENZ HE DOES THE BEST JOB   THO ROCK JR., MD

## 2021-07-23 ENCOUNTER — HOSPITAL ENCOUNTER (EMERGENCY)
Facility: CLINIC | Age: 41
Discharge: HOME | End: 2021-07-23
Attending: EMERGENCY MEDICINE | Admitting: EMERGENCY MEDICINE
Payer: COMMERCIAL

## 2021-07-23 VITALS
TEMPERATURE: 98.2 F | OXYGEN SATURATION: 99 % | WEIGHT: 180 LBS | BODY MASS INDEX: 25.2 KG/M2 | SYSTOLIC BLOOD PRESSURE: 173 MMHG | HEART RATE: 117 BPM | DIASTOLIC BLOOD PRESSURE: 99 MMHG | HEIGHT: 71 IN | RESPIRATION RATE: 18 BRPM
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
## 2021-07-23 PROCEDURE — 99283 EMERGENCY DEPT VISIT LOW MDM: CPT | Mod: 25 | Performed by: EMERGENCY MEDICINE

## 2021-07-23 PROCEDURE — 250N000011 HC RX IP 250 OP 636: Performed by: EMERGENCY MEDICINE

## 2021-07-23 PROCEDURE — 99284 EMERGENCY DEPT VISIT MOD MDM: CPT | Performed by: EMERGENCY MEDICINE

## 2021-07-23 PROCEDURE — 90471 IMMUNIZATION ADMIN: CPT | Performed by: EMERGENCY MEDICINE

## 2021-07-23 PROCEDURE — 90714 TD VACC NO PRESV 7 YRS+ IM: CPT | Performed by: EMERGENCY MEDICINE

## 2021-07-23 RX ADMIN — CLOSTRIDIUM TETANI TOXOID ANTIGEN (FORMALDEHYDE INACTIVATED) AND CORYNEBACTERIUM DIPHTHERIAE TOXOID ANTIGEN (FORMALDEHYDE INACTIVATED) 0.5 ML: 5; 2 INJECTION, SUSPENSION INTRAMUSCULAR at 20:27

## 2021-07-23 ASSESSMENT — MIFFLIN-ST. JEOR: SCORE: 1738.6

## 2021-07-24 ASSESSMENT — ENCOUNTER SYMPTOMS
FEVER: 0
ABDOMINAL PAIN: 0
SHORTNESS OF BREATH: 0

## 2021-07-24 NOTE — DISCHARGE INSTRUCTIONS
Please make an appointment to follow up with Your Primary Care Provider in 3-5 days for wound check.
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
## 2022-12-16 ENCOUNTER — TRANSCRIBE ORDERS (OUTPATIENT)
Dept: OTHER | Age: 42
End: 2022-12-16

## 2022-12-16 DIAGNOSIS — Z79.4 TYPE 2 DIABETES MELLITUS WITH COMPLICATION, WITH LONG-TERM CURRENT USE OF INSULIN (H): Primary | ICD-10-CM

## 2022-12-16 DIAGNOSIS — E11.8 TYPE 2 DIABETES MELLITUS WITH COMPLICATION, WITH LONG-TERM CURRENT USE OF INSULIN (H): Primary | ICD-10-CM

## 2023-02-07 ENCOUNTER — TELEPHONE (OUTPATIENT)
Dept: OPHTHALMOLOGY | Facility: CLINIC | Age: 43
End: 2023-02-07
Payer: COMMERCIAL

## 2023-02-07 NOTE — TELEPHONE ENCOUNTER
Spoke with patient regarding Provider out of clinic and appointment will need rescheduling. Rescheduled patient accordingly and asked patient for missing insurance information. Patient refused to provide missing information. Informed patient appointment would be cancelled and patient can call back to reschedule once he is able to update missing insurance information. Patient is aware of cancelled appointment.-Per Patient via  call